# Patient Record
Sex: FEMALE | Race: WHITE | NOT HISPANIC OR LATINO | Employment: OTHER | URBAN - METROPOLITAN AREA
[De-identification: names, ages, dates, MRNs, and addresses within clinical notes are randomized per-mention and may not be internally consistent; named-entity substitution may affect disease eponyms.]

---

## 2018-02-19 PROCEDURE — 87086 URINE CULTURE/COLONY COUNT: CPT | Performed by: EMERGENCY MEDICINE

## 2018-02-19 PROCEDURE — 87077 CULTURE AEROBIC IDENTIFY: CPT | Performed by: EMERGENCY MEDICINE

## 2018-02-19 PROCEDURE — 87186 SC STD MICRODIL/AGAR DIL: CPT | Performed by: EMERGENCY MEDICINE

## 2018-04-01 PROCEDURE — G0378 HOSPITAL OBSERVATION PER HR: HCPCS

## 2018-04-02 ENCOUNTER — TELEPHONE (OUTPATIENT)
Dept: FAMILY MEDICINE CLINIC | Facility: CLINIC | Age: 83
End: 2018-04-02

## 2018-04-02 ENCOUNTER — APPOINTMENT (OUTPATIENT)
Dept: GENERAL RADIOLOGY | Facility: HOSPITAL | Age: 83
End: 2018-04-02

## 2018-04-02 ENCOUNTER — HOSPITAL ENCOUNTER (OUTPATIENT)
Facility: HOSPITAL | Age: 83
Setting detail: OBSERVATION
Discharge: HOME HEALTH | End: 2018-04-02
Attending: EMERGENCY MEDICINE | Admitting: INTERNAL MEDICINE

## 2018-04-02 VITALS
HEIGHT: 64 IN | RESPIRATION RATE: 16 BRPM | TEMPERATURE: 99.1 F | BODY MASS INDEX: 20.04 KG/M2 | DIASTOLIC BLOOD PRESSURE: 54 MMHG | WEIGHT: 117.4 LBS | SYSTOLIC BLOOD PRESSURE: 108 MMHG | HEART RATE: 65 BPM | OXYGEN SATURATION: 94 %

## 2018-04-02 DIAGNOSIS — R07.9 CHEST PAIN, RULE OUT ACUTE MYOCARDIAL INFARCTION: Primary | ICD-10-CM

## 2018-04-02 DIAGNOSIS — R53.1 WEAKNESS: ICD-10-CM

## 2018-04-02 LAB
ALBUMIN SERPL-MCNC: 4.7 G/DL (ref 3.4–4.8)
ALBUMIN/GLOB SERPL: 1.2 G/DL (ref 1.1–1.8)
ALP SERPL-CCNC: 53 U/L (ref 38–126)
ALT SERPL W P-5'-P-CCNC: 24 U/L (ref 9–52)
ANION GAP SERPL CALCULATED.3IONS-SCNC: 16 MMOL/L (ref 5–15)
AST SERPL-CCNC: 28 U/L (ref 14–36)
BASOPHILS # BLD AUTO: 0.03 10*3/MM3 (ref 0–0.2)
BASOPHILS NFR BLD AUTO: 0.9 % (ref 0–2)
BILIRUB SERPL-MCNC: 0.3 MG/DL (ref 0.2–1.3)
BUN BLD-MCNC: 13 MG/DL (ref 7–21)
BUN/CREAT SERPL: 14.9 (ref 7–25)
CALCIUM SPEC-SCNC: 10.4 MG/DL (ref 8.4–10.2)
CHLORIDE SERPL-SCNC: 100 MMOL/L (ref 95–110)
CO2 SERPL-SCNC: 26 MMOL/L (ref 22–31)
CREAT BLD-MCNC: 0.87 MG/DL (ref 0.5–1)
DEPRECATED RDW RBC AUTO: 53.6 FL (ref 36.4–46.3)
DIGOXIN SERPL-MCNC: 1 NG/ML (ref 0.8–2)
EOSINOPHIL # BLD AUTO: 0.08 10*3/MM3 (ref 0–0.7)
EOSINOPHIL NFR BLD AUTO: 2.4 % (ref 0–7)
ERYTHROCYTE [DISTWIDTH] IN BLOOD BY AUTOMATED COUNT: 18.6 % (ref 11.5–14.5)
GFR SERPL CREATININE-BSD FRML MDRD: 61 ML/MIN/1.73 (ref 60–90)
GLOBULIN UR ELPH-MCNC: 4 GM/DL (ref 2.3–3.5)
GLUCOSE BLD-MCNC: 104 MG/DL (ref 60–100)
HCT VFR BLD AUTO: 35.1 % (ref 35–45)
HGB BLD-MCNC: 11.6 G/DL (ref 12–15.5)
HOLD SPECIMEN: NORMAL
HOLD SPECIMEN: NORMAL
IMM GRANULOCYTES # BLD: 0.02 10*3/MM3 (ref 0–0.02)
IMM GRANULOCYTES NFR BLD: 0.6 % (ref 0–0.5)
LYMPHOCYTES # BLD AUTO: 1.25 10*3/MM3 (ref 0.6–4.2)
LYMPHOCYTES NFR BLD AUTO: 38 % (ref 10–50)
MCH RBC QN AUTO: 25.8 PG (ref 26.5–34)
MCHC RBC AUTO-ENTMCNC: 33 G/DL (ref 31.4–36)
MCV RBC AUTO: 78 FL (ref 80–98)
MONOCYTES # BLD AUTO: 0.6 10*3/MM3 (ref 0–0.9)
MONOCYTES NFR BLD AUTO: 18.2 % (ref 0–12)
NEUTROPHILS # BLD AUTO: 1.31 10*3/MM3 (ref 2–8.6)
NEUTROPHILS NFR BLD AUTO: 39.9 % (ref 37–80)
NRBC BLD MANUAL-RTO: 0 /100 WBC (ref 0–0)
NT-PROBNP SERPL-MCNC: 1020 PG/ML (ref 0–1800)
PLATELET # BLD AUTO: 302 10*3/MM3 (ref 150–450)
PMV BLD AUTO: 10.6 FL (ref 8–12)
POTASSIUM BLD-SCNC: 3.9 MMOL/L (ref 3.5–5.1)
PROT SERPL-MCNC: 8.7 G/DL (ref 6.3–8.6)
RBC # BLD AUTO: 4.5 10*6/MM3 (ref 3.77–5.16)
SODIUM BLD-SCNC: 142 MMOL/L (ref 137–145)
TROPONIN I SERPL-MCNC: 0.04 NG/ML
TROPONIN I SERPL-MCNC: 0.05 NG/ML
TROPONIN I SERPL-MCNC: 0.05 NG/ML
TROPONIN I SERPL-MCNC: <0.012 NG/ML
WBC NRBC COR # BLD: 3.29 10*3/MM3 (ref 3.2–9.8)
WHOLE BLOOD HOLD SPECIMEN: NORMAL
WHOLE BLOOD HOLD SPECIMEN: NORMAL

## 2018-04-02 PROCEDURE — 25010000002 ONDANSETRON PER 1 MG: Performed by: EMERGENCY MEDICINE

## 2018-04-02 PROCEDURE — 25010000002 ENOXAPARIN PER 10 MG: Performed by: INTERNAL MEDICINE

## 2018-04-02 PROCEDURE — G0378 HOSPITAL OBSERVATION PER HR: HCPCS

## 2018-04-02 PROCEDURE — 73502 X-RAY EXAM HIP UNI 2-3 VIEWS: CPT

## 2018-04-02 PROCEDURE — 93010 ELECTROCARDIOGRAM REPORT: CPT | Performed by: INTERNAL MEDICINE

## 2018-04-02 PROCEDURE — 94799 UNLISTED PULMONARY SVC/PX: CPT

## 2018-04-02 PROCEDURE — 96375 TX/PRO/DX INJ NEW DRUG ADDON: CPT

## 2018-04-02 PROCEDURE — 96374 THER/PROPH/DIAG INJ IV PUSH: CPT

## 2018-04-02 PROCEDURE — 85025 COMPLETE CBC W/AUTO DIFF WBC: CPT | Performed by: EMERGENCY MEDICINE

## 2018-04-02 PROCEDURE — 94760 N-INVAS EAR/PLS OXIMETRY 1: CPT

## 2018-04-02 PROCEDURE — 84484 ASSAY OF TROPONIN QUANT: CPT | Performed by: EMERGENCY MEDICINE

## 2018-04-02 PROCEDURE — 80162 ASSAY OF DIGOXIN TOTAL: CPT | Performed by: EMERGENCY MEDICINE

## 2018-04-02 PROCEDURE — 25010000002 MORPHINE PER 10 MG: Performed by: EMERGENCY MEDICINE

## 2018-04-02 PROCEDURE — 96372 THER/PROPH/DIAG INJ SC/IM: CPT

## 2018-04-02 PROCEDURE — 99285 EMERGENCY DEPT VISIT HI MDM: CPT

## 2018-04-02 PROCEDURE — 93005 ELECTROCARDIOGRAM TRACING: CPT | Performed by: EMERGENCY MEDICINE

## 2018-04-02 PROCEDURE — 83880 ASSAY OF NATRIURETIC PEPTIDE: CPT | Performed by: EMERGENCY MEDICINE

## 2018-04-02 PROCEDURE — 84484 ASSAY OF TROPONIN QUANT: CPT | Performed by: FAMILY MEDICINE

## 2018-04-02 PROCEDURE — 71045 X-RAY EXAM CHEST 1 VIEW: CPT

## 2018-04-02 PROCEDURE — 80053 COMPREHEN METABOLIC PANEL: CPT | Performed by: EMERGENCY MEDICINE

## 2018-04-02 PROCEDURE — 84484 ASSAY OF TROPONIN QUANT: CPT | Performed by: INTERNAL MEDICINE

## 2018-04-02 RX ORDER — ONDANSETRON 2 MG/ML
4 INJECTION INTRAMUSCULAR; INTRAVENOUS ONCE
Status: COMPLETED | OUTPATIENT
Start: 2018-04-02 | End: 2018-04-02

## 2018-04-02 RX ORDER — VALSARTAN 160 MG/1
320 TABLET ORAL DAILY
Status: DISCONTINUED | OUTPATIENT
Start: 2018-04-02 | End: 2018-04-02 | Stop reason: HOSPADM

## 2018-04-02 RX ORDER — DOCUSATE SODIUM 100 MG/1
100 CAPSULE, LIQUID FILLED ORAL 2 TIMES DAILY
Status: DISCONTINUED | OUTPATIENT
Start: 2018-04-02 | End: 2018-04-02 | Stop reason: HOSPADM

## 2018-04-02 RX ORDER — DIGOXIN 125 MCG
125 TABLET ORAL
Status: DISCONTINUED | OUTPATIENT
Start: 2018-04-02 | End: 2018-04-02 | Stop reason: HOSPADM

## 2018-04-02 RX ORDER — ONDANSETRON 4 MG/1
4 TABLET, ORALLY DISINTEGRATING ORAL EVERY 8 HOURS PRN
Status: DISCONTINUED | OUTPATIENT
Start: 2018-04-02 | End: 2018-04-02 | Stop reason: HOSPADM

## 2018-04-02 RX ORDER — AMLODIPINE BESYLATE 5 MG/1
5 TABLET ORAL DAILY
Status: DISCONTINUED | OUTPATIENT
Start: 2018-04-02 | End: 2018-04-02 | Stop reason: HOSPADM

## 2018-04-02 RX ORDER — ACETAMINOPHEN 325 MG/1
650 TABLET ORAL EVERY 6 HOURS PRN
Status: DISCONTINUED | OUTPATIENT
Start: 2018-04-02 | End: 2018-04-02 | Stop reason: HOSPADM

## 2018-04-02 RX ORDER — SODIUM CHLORIDE 0.9 % (FLUSH) 0.9 %
10 SYRINGE (ML) INJECTION AS NEEDED
Status: DISCONTINUED | OUTPATIENT
Start: 2018-04-02 | End: 2018-04-02 | Stop reason: HOSPADM

## 2018-04-02 RX ORDER — CLONIDINE HYDROCHLORIDE 0.1 MG/1
0.1 TABLET ORAL EVERY 12 HOURS SCHEDULED
Status: DISCONTINUED | OUTPATIENT
Start: 2018-04-02 | End: 2018-04-02 | Stop reason: HOSPADM

## 2018-04-02 RX ORDER — MORPHINE SULFATE 2 MG/ML
4 INJECTION, SOLUTION INTRAMUSCULAR; INTRAVENOUS ONCE
Status: COMPLETED | OUTPATIENT
Start: 2018-04-02 | End: 2018-04-02

## 2018-04-02 RX ORDER — ASPIRIN 81 MG/1
81 TABLET, CHEWABLE ORAL DAILY
Status: DISCONTINUED | OUTPATIENT
Start: 2018-04-02 | End: 2018-04-02 | Stop reason: HOSPADM

## 2018-04-02 RX ORDER — METOPROLOL TARTRATE 50 MG/1
50 TABLET, FILM COATED ORAL EVERY 12 HOURS SCHEDULED
Status: DISCONTINUED | OUTPATIENT
Start: 2018-04-02 | End: 2018-04-02 | Stop reason: HOSPADM

## 2018-04-02 RX ORDER — CLONIDINE HYDROCHLORIDE 0.1 MG/1
0.1 TABLET ORAL ONCE
Status: DISCONTINUED | OUTPATIENT
Start: 2018-04-02 | End: 2018-04-02 | Stop reason: HOSPADM

## 2018-04-02 RX ORDER — VALSARTAN 160 MG/1
320 TABLET ORAL DAILY
Status: DISCONTINUED | OUTPATIENT
Start: 2018-04-02 | End: 2018-04-02 | Stop reason: SDUPTHER

## 2018-04-02 RX ORDER — ALPRAZOLAM 0.25 MG/1
0.25 TABLET ORAL 3 TIMES DAILY PRN
Status: DISCONTINUED | OUTPATIENT
Start: 2018-04-02 | End: 2018-04-02

## 2018-04-02 RX ORDER — CILOSTAZOL 100 MG/1
50 TABLET ORAL
Status: DISCONTINUED | OUTPATIENT
Start: 2018-04-02 | End: 2018-04-02 | Stop reason: HOSPADM

## 2018-04-02 RX ORDER — ISOSORBIDE MONONITRATE 60 MG/1
60 TABLET, EXTENDED RELEASE ORAL DAILY
Status: DISCONTINUED | OUTPATIENT
Start: 2018-04-02 | End: 2018-04-02 | Stop reason: HOSPADM

## 2018-04-02 RX ORDER — FERROUS SULFATE TAB EC 324 MG (65 MG FE EQUIVALENT) 324 (65 FE) MG
324 TABLET DELAYED RESPONSE ORAL
Status: DISCONTINUED | OUTPATIENT
Start: 2018-04-02 | End: 2018-04-02 | Stop reason: HOSPADM

## 2018-04-02 RX ORDER — ASPIRIN 81 MG/1
81 TABLET, CHEWABLE ORAL DAILY
COMMUNITY

## 2018-04-02 RX ORDER — NITROGLYCERIN 0.4 MG/1
0.4 TABLET SUBLINGUAL
Status: DISCONTINUED | OUTPATIENT
Start: 2018-04-02 | End: 2018-04-02 | Stop reason: HOSPADM

## 2018-04-02 RX ORDER — SODIUM CHLORIDE 0.9 % (FLUSH) 0.9 %
1-10 SYRINGE (ML) INJECTION AS NEEDED
Status: DISCONTINUED | OUTPATIENT
Start: 2018-04-02 | End: 2018-04-02 | Stop reason: HOSPADM

## 2018-04-02 RX ADMIN — DIGOXIN 125 MCG: 125 TABLET ORAL at 11:09

## 2018-04-02 RX ADMIN — CILOSTAZOL 50 MG: 100 TABLET ORAL at 08:22

## 2018-04-02 RX ADMIN — FERROUS SULFATE TAB EC 324 MG (65 MG FE EQUIVALENT) 324 MG: 324 (65 FE) TABLET DELAYED RESPONSE at 08:23

## 2018-04-02 RX ADMIN — MORPHINE SULFATE 4 MG: 2 INJECTION, SOLUTION INTRAMUSCULAR; INTRAVENOUS at 03:31

## 2018-04-02 RX ADMIN — DOCUSATE SODIUM 100 MG: 100 CAPSULE, LIQUID FILLED ORAL at 08:33

## 2018-04-02 RX ADMIN — Medication 10 ML: at 03:51

## 2018-04-02 RX ADMIN — AMLODIPINE BESYLATE 5 MG: 5 TABLET ORAL at 08:24

## 2018-04-02 RX ADMIN — METOPROLOL TARTRATE 50 MG: 50 TABLET ORAL at 08:22

## 2018-04-02 RX ADMIN — ACETAMINOPHEN 650 MG: 325 TABLET ORAL at 10:59

## 2018-04-02 RX ADMIN — ENOXAPARIN SODIUM 40 MG: 40 INJECTION SUBCUTANEOUS at 08:21

## 2018-04-02 RX ADMIN — ASPIRIN 81 MG 81 MG: 81 TABLET ORAL at 08:33

## 2018-04-02 RX ADMIN — VALSARTAN 320 MG: 160 TABLET, FILM COATED ORAL at 10:59

## 2018-04-02 RX ADMIN — ONDANSETRON HYDROCHLORIDE 4 MG: 2 INJECTION INTRAMUSCULAR; INTRAVENOUS at 02:42

## 2018-04-02 RX ADMIN — ISOSORBIDE MONONITRATE 60 MG: 60 TABLET, EXTENDED RELEASE ORAL at 08:22

## 2018-04-02 RX ADMIN — CLONIDINE HYDROCHLORIDE 0.1 MG: 0.1 TABLET ORAL at 08:23

## 2018-04-02 NOTE — ED PROVIDER NOTES
Subjective   History of Present Illness  89yo female presents to emergency department because of chest pain.  Supposedly woke from sleep about 11 PM.  This resolved prior to arrival in the ED.  She is complaining intermittently of some lower extremity pain especially in the right hip area.  Denies any falls.  She doesn't produce history of cardiac disease and takes nitroglycerin for this.  She did not have to take any nitroglycerin to resolve his pain.  When I ask her she didn't tell if she had any chest pain she just told me that she hurts all over.  The  told me that she was complaining of chest pain earlier and they were concerned she could've been having a heart attack.  Evaluation in the ED she denied having any chest pain states that she hurts all over earlier.  At the moment she denied having any pain in the emergency department.  Review of Systems   Constitutional: Negative for chills and fever.   HENT: Negative for rhinorrhea, sinus pressure and sneezing.    Eyes: Negative for pain and redness.   Respiratory: Negative for cough, chest tightness and shortness of breath.    Cardiovascular: Positive for chest pain.   Gastrointestinal: Positive for nausea. Negative for abdominal pain, diarrhea and vomiting.   Genitourinary: Negative for dysuria, flank pain, menstrual problem, pelvic pain, vaginal bleeding, vaginal discharge and vaginal pain.   Musculoskeletal:        Negative except for history of present illness   Skin: Negative for rash.   Neurological: Negative for dizziness, syncope, weakness, numbness and headaches.   Hematological: Negative.    Psychiatric/Behavioral: Negative for self-injury and suicidal ideas.       Past Medical History:   Diagnosis Date   • Acute bronchitis    • Anxiety state    • Backache    • Cough    • Dysuria    • Endocrine hypertension    • Essential hypertension    • GERD (gastroesophageal reflux disease)    • Hip pain    • Hyperlipidemia    • Hypertensive disorder    •  Insomnia    • Osteoarthritis    • Osteopenia    • Pathological fracture of vertebra    • Phlebitis    • RLQ abdominal pain    • Suprapubic pain    • UTI (urinary tract infection)        Allergies   Allergen Reactions   • Lipitor [Atorvastatin]    • Tekturna [Aliskiren]        Past Surgical History:   Procedure Laterality Date   • CARDIAC PACEMAKER PLACEMENT     • CHOLECYSTECTOMY     • HEMORRHOIDECTOMY     • INJECTION OF MEDICATION  11/26/2012    Depo Medrol   • PAP SMEAR  07/2004   • REPLACEMENT TOTAL KNEE  08/2005   • VEIN SURGERY      left vein removal       Family History   Problem Relation Age of Onset   • Hypertension Other        Social History     Social History   • Marital status:      Social History Main Topics   • Smoking status: Never Smoker   • Alcohol use No   • Drug use: Unknown     Other Topics Concern   • Not on file           Objective   Physical Exam   Constitutional: She is oriented to person, place, and time. She appears well-developed and well-nourished.   HENT:   Head: Normocephalic and atraumatic.   Eyes: Conjunctivae and EOM are normal. Pupils are equal, round, and reactive to light.   Neck: Normal range of motion. Neck supple.   Cardiovascular: Normal rate, regular rhythm and normal heart sounds.    Pulmonary/Chest: Effort normal. No respiratory distress.   Abdominal: Soft. Bowel sounds are normal. She exhibits no distension. There is no tenderness.   Musculoskeletal: Normal range of motion.   Patient has warm bilateral lower extremities with 2+ pedal pulses.  Brisk capillary refill.  No deformities.  There is no bruising or ecchymosis.    No crepitus.  No erythema or signs of cellulitis   Neurological: She is alert and oriented to person, place, and time.   Skin: Skin is warm and dry. Capillary refill takes less than 2 seconds.   Psychiatric: She has a normal mood and affect.   Nursing note and vitals reviewed.      Procedures         ED Course  ED Course      Labs Reviewed    TROPONIN (IN-HOUSE) - Abnormal; Notable for the following:        Result Value    Troponin I 0.035 (*)     All other components within normal limits   COMPREHENSIVE METABOLIC PANEL - Abnormal; Notable for the following:     Glucose 104 (*)     Calcium 10.4 (*)     Total Protein 8.7 (*)     Globulin 4.0 (*)     Anion Gap 16.0 (*)     All other components within normal limits    Narrative:     The MDRD GFR formula is only valid for adults with stable renal function between ages 18 and 70.   CBC WITH AUTO DIFFERENTIAL - Abnormal; Notable for the following:     Hemoglobin 11.6 (*)     MCV 78.0 (*)     MCH 25.8 (*)     RDW 18.6 (*)     RDW-SD 53.6 (*)     Monocyte % 18.2 (*)     Immature Grans % 0.6 (*)     Neutrophils, Absolute 1.31 (*)     All other components within normal limits   DIGOXIN LEVEL - Normal   TROPONIN (IN-HOUSE) - Normal   BNP (IN-HOUSE) - Normal   RAINBOW DRAW    Narrative:     The following orders were created for panel order [place] Draw.  Procedure                               Abnormality         Status                     ---------                               -----------         ------                     Light Blue Top[[id number]]                                   Final result               Green Top (Gel)[[id number]]                                  Final result               Lavender Top[[id number]]                                     Final result               Gold Top - SST[[id number]]                                   Final result                 Please view results for these tests on the individual orders.   CBC AND DIFFERENTIAL    Narrative:     The following orders were created for panel order CBC & Differential.  Procedure                               Abnormality         Status                     ---------                               -----------         ------                     CBC Auto Differential[[id number]]        Abnormal            Final result                 Please view  results for these tests on the individual orders.   LIGHT BLUE TOP   GREEN TOP   LAVENDER TOP   GOLD TOP - SST     XR Hip With or Without Pelvis 2 - 3 View Right   Final Result   Mild degenerative changes with no acute abnormality.      Electronically signed by:  Guille Robles  4/2/2018 4:02 AM CDT   Workstation: [id number]      XR Chest 1 View   Final Result   Hiatal hernia with no acute cardiopulmonary disease.      Electronically signed by:  Guille Robles  4/2/2018 2:04 AM CDT   Workstation: [id number]                    MDM  Number of Diagnoses or Management Options  Chest pain, rule out acute myocardial infarction:   Diagnosis management comments: Patient had an episode of chest pain earlier in the evening.  Delta troponin went from undetectable to 0.035.  Known history of coronary disease.  Admit for ACS rule out.      Final diagnoses:   Chest pain, rule out acute myocardial infarction            Josafat Arriola MD  04/02/18 3574

## 2018-04-02 NOTE — TELEPHONE ENCOUNTER
La Nena neil/ [facility] is needed an order for Home Health. They are trying to get her admitted tomorrow 4/3/18.

## 2018-04-02 NOTE — CONSULTS
Adult Nutrition  Assessment    Patient Name:  Ruth Monaco  YOB: 1929  MRN: [id number]  Admit Date:  4/2/2018    Assessment Date:  4/2/2018    Comments:  Pt screened at risk on nutrition screen for not having dentures.  Family present reports someone is on their way to bring pt dentures, declines need for mechanically altered diet.  RD made pt and family aware of soft menu options.  Family reports pt mainly eats icecream, will send on trays.  No significant wt loss reported, but wt is down 8lb from stated wt.  No GI complaints.  RD will monitor.          Adult Nutrition Assessment     Row Name 04/02/18 1217       Labs/Procedures/Meds    Lab Results Reviewed reviewed, pertinent       Physical Findings    Oral/Mouth Cavity poor dentition       Calculation Measurements    Weight Used For Calculations 53.1 kg (117 lb)       Estimated/Assessed Needs    Additional Documentation KCAL/KG (Group);Fluid Requirements (Group);Protein Requirements (Group)       KCAL/KG    14 Kcal/Kg (kcal) 742.99    15 Kcal/Kg (kcal) 796.06    18 Kcal/Kg (kcal) 955.28    20 Kcal/Kg (kcal) 1061.42    25 Kcal/Kg (kcal) 1326.78    30 Kcal/Kg (kcal) 1592.13    35 Kcal/Kg (kcal) 1857.48    40 Kcal/Kg (kcal) 2122.84    45 Kcal/Kg (kcal) 2388.2    50 Kcal/Kg (kcal) 2653.55    kcal/kg (Specify) 25       Toxey-St. Jeor Equation    RMR (Toxey-St. Jeor Equation) 945.71       Protein Requirements    Est Protein Requirement Amount (gms/kg) 1.2 gm protein    Estimated Protein Requirements (gms/day) 63.69       Fluid Requirements    Estimated Fluid Requirements (mL/day) 1500    Estimated Fluid Requirement Method other (see comments)    RDA Method (mL) 1500    Eusebio-Arnulfo Method (over 20 kg) 2561.42       Nutrition Prescription PO    Current PO Diet Regular    Common Modifiers Cardiac;Consistent Carbohydrate       PO Evaluation    Number of Meals 1    % PO Intake 75    Row Name 04/02/18 1216       Reason for Assessment    Reason For  "Assessment identified at risk by screening criteria    Identified At Risk by Screening Criteria difficulty chewing/swallowing       Nutrition/Diet History    Typical Food/Fluid Intake Family reports someone is bringing pt dentures to hospital.    Food Preferences Likes icecream.    Row Name 04/02/18 0554       Anthropometrics    Height 162.6 cm (64\")    Weight 53.3 kg (117 lb 6.4 oz)       Ideal Body Weight (IBW)    Ideal Body Weight (IBW) (kg) 55    % Ideal Body Weight 96.81       Body Mass Index (BMI)    BMI (kg/m2) 20.19       IBW Adjustment, Para/Tetraplegia    5% Adjustment, Para (IBW) 52.25    10% Adjustment, Para (IBW) 49.5    10% Adjustment, Tetra (IBW) 49.5    15% Adjustment, Tetra (IBW) 46.75          Electronically signed by:  Michelle Apodaca RD  04/02/18 12:18 PM  "

## 2018-04-02 NOTE — H&P
[facility] Emergency [facility] Admission      Date of Admission: 4/2/2018      Primary Care Physician: Edgar Wong MD      Chief Complaint: weakness     HPI:This is an 88 y old female  Who came to our ER brought by her  as he states that she woke him up at 11 pm complaining of chest pain . The patient  When asked did not tell me that she stated that she was hurting all over that was sick all over   Dr lynn saw her he ordered some troponins the second set was very mildly elevated  . Her BP was elevated by the time  I saw her she denied any chest pain . She denied any palpitation or shortness of breath .      Past Medical History:  has a past medical history of Acute bronchitis; Anxiety state; Backache; Cough; Dysuria; Endocrine hypertension; Essential hypertension; GERD (gastroesophageal reflux disease); Hip pain; Hyperlipidemia; Hypertensive disorder; Insomnia; Osteoarthritis; Osteopenia; Pathological fracture of vertebra; Phlebitis; RLQ abdominal pain; Suprapubic pain; and UTI (urinary tract infection).    Past Surgical History:  has a past surgical history that includes Cardiac pacemaker placement; Cholecystectomy; Injection of Medication (11/26/2012); Hemorrhoid surgery; Pap Smear (07/2004); Vein Surgery; and Replacement total knee (08/2005).    Family History: family history includes Hypertension in her other.    Social History:  reports that she has never smoked. She does not have any smokeless tobacco history on file. She reports that she does not drink alcohol.    Allergies:   Allergies   Allergen Reactions   • Lipitor [Atorvastatin]    • Tekturna [Aliskiren]        Medications:   Prior to Admission medications    Medication Sig Start Date End Date Taking? Authorizing Provider   amLODIPine (NORVASC) 5 MG tablet Take 5 mg by mouth Daily.   Yes Nurse [facility], RN   aspirin 81 MG chewable tablet Chew 81 mg Daily.   Yes Historical Provider, MD   cilostazol  (PLETAL) 50 MG tablet Take 50 mg by mouth 2 (Two) Times a Day.   Yes Nurse [facility], RN   cloNIDine (CATAPRES) 0.1 MG tablet Take 0.1 mg by mouth 2 (Two) Times a Day.   Yes Historical Provider, MD   digoxin (LANOXIN) 125 MCG tablet Take 125 mcg by mouth Daily.   Yes Nurse [facility], RN   isosorbide mononitrate (IMDUR) 60 MG 24 hr tablet Take 60 mg by mouth Daily.   Yes Historical Provider, MD   metoprolol tartrate (LOPRESSOR) 50 MG tablet Take 50 mg by mouth 2 (Two) Times a Day.   Yes Nurse [facility], RN   temazepam (RESTORIL) 30 MG capsule Take 30 mg by mouth At Night As Needed for sleep.   Yes Nurse [facility], RN   valsartan (DIOVAN) 320 MG tablet Take 320 mg by mouth Daily.   Yes Historical Provider, MD   ALPRAZolam (XANAX) 0.25 MG tablet Take 0.25 mg by mouth 3 (Three) Times a Day As Needed for anxiety.    Historical Provider, MD   docusate sodium (COLACE) 100 MG capsule Take 100 mg by mouth 2 (Two) Times a Day.    Historical Provider, MD   ferrous sulfate 325 (65 FE) MG tablet Take 325 mg by mouth 2 (Two) Times a Day.    Historical Provider, MD   HYDROcodone-acetaminophen (NORCO) 5-325 MG per tablet Take 1 tablet by mouth 4 (Four) Times a Day As Needed.    Historical Provider, MD   lidocaine (LIDODERM) 5 % Place 1 patch on the skin Daily. Remove & Discard patch within 12 hours or as directed by MD    Historical Provider, MD   ondansetron ODT (ZOFRAN-ODT) 4 MG disintegrating tablet Take 1 tablet by mouth Every 8 (Eight) Hours As Needed for Nausea or Vomiting for up to 12 doses. 2/19/18   Clyde Patton MD   phenazopyridine (PYRIDIUM) 100 MG tablet Take 1 tablet by mouth 3 (Three) Times a Day As Needed for bladder spasms. 2/19/18   Clyde Patton MD   valsartan (DIOVAN) 160 MG tablet Take 320 mg by mouth Daily.    Nurse [facility], RN       Review of Systems:  Review of Systems   Constitutional: Positive for fatigue. Negative for activity change, appetite change, chills, diaphoresis,  fever and unexpected weight change.   HENT: Negative.  Negative for congestion, dental problem, drooling, ear discharge, ear pain, facial swelling, hearing loss, mouth sores, nosebleeds, postnasal drip, rhinorrhea, sinus pressure, sneezing, tinnitus, trouble swallowing and voice change.    Eyes: Negative for photophobia and discharge.   Respiratory: Positive for chest tightness. Negative for apnea, cough, choking, shortness of breath, wheezing and stridor.    Cardiovascular: Negative for chest pain, palpitations and leg swelling.   Gastrointestinal: Negative for abdominal pain, anal bleeding, blood in stool, constipation, diarrhea, nausea, rectal pain and vomiting.   Endocrine: Negative for cold intolerance, heat intolerance, polydipsia, polyphagia and polyuria.   Genitourinary: Negative for dysuria, enuresis, frequency, hematuria and urgency.   Musculoskeletal: Negative for arthralgias, back pain, joint swelling, myalgias, neck pain and neck stiffness.   Skin: Negative for color change, pallor, rash and wound.   Allergic/Immunologic: Negative for food allergies and immunocompromised state.   Neurological: Positive for weakness. Negative for dizziness, tremors, seizures, syncope, facial asymmetry, speech difficulty, light-headedness, numbness and headaches.   Hematological: Negative for adenopathy.   Psychiatric/Behavioral: Negative for agitation, behavioral problems, confusion, hallucinations, sleep disturbance and suicidal ideas. The patient is not nervous/anxious.       Otherwise complete ROS is negative except as mentioned above.    Physical Exam:   Temp:  [98.2 °F (36.8 °C)] 98.2 °F (36.8 °C)  Heart Rate:  [62-88] 72  Resp:  [18-23] 21  BP: (146-192)/(67-85) 157/67  Physical Exam   Constitutional: She is oriented to person, place, and time. She appears well-developed and well-nourished.   HENT:   Head: Normocephalic and atraumatic.   Eyes: EOM are normal. Pupils are equal, round, and reactive to light.   Neck:  Normal range of motion. Neck supple.   Cardiovascular: Normal rate, regular rhythm and normal heart sounds.  Exam reveals no gallop and no friction rub.    No murmur heard.  Pulmonary/Chest: Effort normal and breath sounds normal.   Abdominal: Soft. Bowel sounds are normal.   Musculoskeletal: She exhibits no edema.   Neurological: She is alert and oriented to person, place, and time.   Skin: Skin is warm and dry.         Results Reviewed:  I have personally reviewed current lab, radiology, and data and agree with results.  Lab Results (last 24 hours)     Procedure Component Value Units Date/Time    Troponin [[id number]]  (Abnormal) Collected:  04/02/18 0423    Specimen:  Blood Updated:  04/02/18 0454     Troponin I 0.035 (H) ng/mL     [facility] Draw [[id number]] Collected:  04/02/18 0139    Specimen:  Blood Updated:  04/02/18 0246    Narrative:       The following orders were created for panel order [facility] Draw.  Procedure                               Abnormality         Status                     ---------                               -----------         ------                     Light Blue Top[[id number]]                                   Final result               Green Top (Gel)[[id number]]                                  Final result               Lavender Top[[id number]]                                     Final result               Gold Top - SST[[id number]]                                   Final result                 Please view results for these tests on the individual orders.    Light Blue Top [[id number]] Collected:  04/02/18 0139    Specimen:  Blood Updated:  04/02/18 0246     Extra Tube hold for add-on     Comment: Auto resulted       Green Top (Gel) [[id number]] Collected:  04/02/18 0139    Specimen:  Blood Updated:  04/02/18 0246     Extra Tube Hold for add-ons.     Comment: Auto resulted.       Lavender Top [[id number]] Collected:  04/02/18 0139    Specimen:  Blood Updated:  04/02/18 0246     Extra Tube  hold for add-on     Comment: Auto resulted       Gold Top - SST [[id number]] Collected:  04/02/18 0139    Specimen:  Blood Updated:  04/02/18 0246     Extra Tube Hold for add-ons.     Comment: Auto resulted.       BNP [[id number]]  (Normal) Collected:  04/02/18 0139    Specimen:  Blood Updated:  04/02/18 0212     proBNP 1,020.0 pg/mL     Troponin [[id number]]  (Normal) Collected:  04/02/18 0139    Specimen:  Blood Updated:  04/02/18 0212     Troponin I <0.012 ng/mL     Digoxin Level [[id number]]  (Normal) Collected:  04/02/18 0139    Specimen:  Blood Updated:  04/02/18 0204     Digoxin 1.00 ng/mL     Comprehensive Metabolic Panel [[id number]]  (Abnormal) Collected:  04/02/18 0139    Specimen:  Blood Updated:  04/02/18 0200     Glucose 104 (H) mg/dL      BUN 13 mg/dL      Creatinine 0.87 mg/dL      Sodium 142 mmol/L      Potassium 3.9 mmol/L      Chloride 100 mmol/L      CO2 26.0 mmol/L      Calcium 10.4 (H) mg/dL      Total Protein 8.7 (H) g/dL      Albumin 4.70 g/dL      ALT (SGPT) 24 U/L      AST (SGOT) 28 U/L      Alkaline Phosphatase 53 U/L      Total Bilirubin 0.3 mg/dL      eGFR Non African Amer 61 mL/min/1.73      Globulin 4.0 (H) gm/dL      A/G Ratio 1.2 g/dL      BUN/Creatinine Ratio 14.9     Anion Gap 16.0 (H) mmol/L     Narrative:       The MDRD GFR formula is only valid for adults with stable renal function between ages 18 and 70.    CBC & Differential [[id number]] Collected:  04/02/18 0139    Specimen:  Blood Updated:  04/02/18 0157    Narrative:       The following orders were created for panel order CBC & Differential.  Procedure                               Abnormality         Status                     ---------                               -----------         ------                     CBC Auto Differential[[id number]]        Abnormal            Final result                 Please view results for these tests on the individual orders.    CBC Auto Differential [[id number]]  (Abnormal) Collected:  04/02/18  0139    Specimen:  Blood Updated:  04/02/18 0157     WBC 3.29 10*3/mm3      RBC 4.50 10*6/mm3      Hemoglobin 11.6 (L) g/dL      Hematocrit 35.1 %      MCV 78.0 (L) fL      MCH 25.8 (L) pg      MCHC 33.0 g/dL      RDW 18.6 (H) %      RDW-SD 53.6 (H) fl      MPV 10.6 fL      Platelets 302 10*3/mm3      Neutrophil % 39.9 %      Lymphocyte % 38.0 %      Monocyte % 18.2 (H) %      Eosinophil % 2.4 %      Basophil % 0.9 %      Immature Grans % 0.6 (H) %      Neutrophils, Absolute 1.31 (L) 10*3/mm3      Lymphocytes, Absolute 1.25 10*3/mm3      Monocytes, Absolute 0.60 10*3/mm3      Eosinophils, Absolute 0.08 10*3/mm3      Basophils, Absolute 0.03 10*3/mm3      Immature Grans, Absolute 0.02 10*3/mm3      nRBC 0.0 /100 WBC         Imaging Results (last 24 hours)     Procedure Component Value Units Date/Time    XR Hip With or Without Pelvis 2 - 3 View Right [[id number]] Collected:  04/02/18 0344     Updated:  04/02/18 0403    Narrative:       Pelvis and right hip total three view on 4/2/2018    CLINICAL INDICATION: Right hip pain    COMPARISON: CT from 10/6/2016    FINDINGS: Vascular calcifications are noted. Degenerative changes  are noted along the pubic symphysis. Mild degenerative changes  are noted in the right greater than left hip. Degenerative  changes are noted in the lower lumbar spine. There are no  fractures. The hips are well located. The SI joints are well  aligned.      Impression:       Mild degenerative changes with no acute abnormality.    Electronically signed by:  Guille Robles  4/2/2018 4:02 AM CDT  Workstation: [id number]    XR Chest 1 View [[id number]] Collected:  04/02/18 0151     Updated:  04/02/18 0205    Narrative:         Chest single view on  4/2/2018     CLINICAL INDICATION: Chest pain    COMPARISON: 5/16/2013    FINDINGS: There is elevation of the right hemidiaphragm. There is  a moderate size hiatal hernia. 2-lead left subclavian pacemaker  is noted in place. Vascular calcification is  noted in the aorta.  Heart is upper limits normal for size. Hilar and mediastinal  contours are otherwise within normal limits. The lungs are clear.      Impression:       Hiatal hernia with no acute cardiopulmonary disease.    Electronically signed by:  Guille Robles  4/2/2018 2:04 AM CDT  Workstation: [id number]            Assessment:    Hospital Problem List     Chest pain, rule out acute myocardial infarction                Plan:observation   Telemetry   Aspirin  Pain medicine prn serial cardiac enzymes  Continue home medicine as medical course dictates   dvt prophylaxis lovenox     I discussed the patients findings and my recommendations with: patient and Er provider     Mallika Huffman MD  04/02/18  5:28 AM

## 2018-04-02 NOTE — DISCHARGE SUMMARY
DISCHARGE SUMMARY       Date of Admission: 4/2/2018  Date of Discharge:  4/2/2018  Primary Care Physician: Edgar Wong MD    Presenting Problem/History of Present Illness:  Chest pain, rule out acute myocardial infarction [R07.9]       Final Discharge Diagnoses:  1.  Hypertensive Crisis  2.  Chest pain secondary to #1  3.  Essential HTN  4.  HLD           Consults:   Consults     Date and Time Order Name Status Description    4/2/2018 0505 Hospitalist (on-call MD unless specified)            Procedures Performed: NA                Pertinent Test Results: NA    Chief Complaint on Day of Discharge: <principal problem not specified>    Hospital Course:  The patient is a 88 y.o. female who presented to [facility] with Chest pain.  She had awoken in the middle of the night with chest pain.  On arrival she had elevated blood pressure.  Her systolic blood pressure was 192.  Since her blood pressure resolved, she has no more chest pain.  She is not SOA.  She would like to go home.      She did have minimal troponin elevations:  0.035,  0.048, 0.051.  I do not feel this trend is consistent with ACS.  Given that her chest pain resolved with her normalized blood pressure, this is more likely a type 2 elevation related to supply/demand mismatch secondary to her hypertension.  I discussed the case with Dr. La of cardiology who agrees it is likely related to blood pressure.      While on her home BP meds here, she has a systolic blood pressure of 135.  I would venture to guess she missed some doses of Clonidine and had rebound hypertension.    At the time of discharge, she was comfortable with being discharged and with the discharge plan.  She was given the chance to ask questions and all of her questions were answered to her complete satisfaction.        Condition on Discharge:  Good  Review of Systems    Physical Exam on Discharge:  BP (P) 108/54 (BP Location: Right arm, Patient Position: Lying)    "Pulse (P) 65   Temp (P) 99.1 °F (37.3 °C) (Oral)   Resp (P) 16   Ht 162.6 cm (64\")   Wt 53.3 kg (117 lb 6.4 oz)   SpO2 (P) 94%   BMI 20.15 kg/m²   Physical Exam   Constitutional: She is oriented to person, place, and time. She appears well-developed and well-nourished.   HENT:   Head: Normocephalic and atraumatic.   Right Ear: External ear normal.   Left Ear: External ear normal.   Nose: Nose normal.   Mouth/Throat: Oropharynx is clear and moist.   Eyes: Conjunctivae and EOM are normal.   Neck: Normal range of motion. Neck supple.   Cardiovascular: Normal rate, regular rhythm and normal heart sounds.    Pulmonary/Chest: Effort normal and breath sounds normal.   Abdominal: Soft. Bowel sounds are normal. She exhibits no distension. There is no tenderness.   Musculoskeletal: Normal range of motion.   Neurological: She is alert and oriented to person, place, and time.   Skin: Skin is warm and dry.   Psychiatric: She has a normal mood and affect. Her speech is normal and behavior is normal. Cognition and memory are normal.         Discharge Disposition: Home       Discharge Medications:   Ruth Monaco   Home Medication Instructions TAMMI:[id number]    Printed on:04/02/18 1420   Medication Information                      amLODIPine (NORVASC) 5 MG tablet  Take 5 mg by mouth Daily.             aspirin 81 MG chewable tablet  Chew 81 mg Daily.             cilostazol (PLETAL) 50 MG tablet  Take 50 mg by mouth 2 (Two) Times a Day.             cloNIDine (CATAPRES) 0.1 MG tablet  Take 0.1 mg by mouth 2 (Two) Times a Day.             digoxin (LANOXIN) 125 MCG tablet  Take 125 mcg by mouth Daily.             docusate sodium (COLACE) 100 MG capsule  Take 100 mg by mouth 2 (Two) Times a Day.             ferrous sulfate 325 (65 FE) MG tablet  Take 325 mg by mouth 2 (Two) Times a Day.             HYDROcodone-acetaminophen (NORCO) 5-325 MG per tablet  Take 1 tablet by mouth 4 (Four) Times a Day As Needed.           "   isosorbide mononitrate (IMDUR) 60 MG 24 hr tablet  Take 60 mg by mouth Daily.             lidocaine (LIDODERM) 5 %  Place 1 patch on the skin Daily. Remove & Discard patch within 12 hours or as directed by MD             metoprolol tartrate (LOPRESSOR) 50 MG tablet  Take 50 mg by mouth 2 (Two) Times a Day.             ondansetron ODT (ZOFRAN-ODT) 4 MG disintegrating tablet  Take 1 tablet by mouth Every 8 (Eight) Hours As Needed for Nausea or Vomiting for up to 12 doses.             phenazopyridine (PYRIDIUM) 100 MG tablet  Take 1 tablet by mouth 3 (Three) Times a Day As Needed for bladder spasms.             temazepam (RESTORIL) 30 MG capsule  Take 30 mg by mouth At Night As Needed for sleep.             valsartan (DIOVAN) 160 MG tablet  Take 320 mg by mouth Daily.             valsartan (DIOVAN) 320 MG tablet  Take 320 mg by mouth Daily.                 Discharge Diet: Regular    Activity at Discharge: as tolerated      Special Instructions: Patient instructed to call MD or return to ED with worsening shortness of breath, chest pain, fever greater than 100.4 degrees F or any other medical concerns patient may have.     Follow-up Appointments:   Follow-up Information     Edgar Wong MD Follow up.    Specialty:  Internal Medicine  Contact information:  [address] [id number] [phone]                 No future appointments.    Time: Discharge 30 minutes min            This document has been electronically signed by Karl Monaco MD on April 2, 2018 2:20 PM